# Patient Record
Sex: FEMALE | Race: BLACK OR AFRICAN AMERICAN | Employment: STUDENT | URBAN - METROPOLITAN AREA
[De-identification: names, ages, dates, MRNs, and addresses within clinical notes are randomized per-mention and may not be internally consistent; named-entity substitution may affect disease eponyms.]

---

## 2023-03-07 ENCOUNTER — OFFICE VISIT (OUTPATIENT)
Dept: ORTHOPEDIC SURGERY | Age: 23
End: 2023-03-07

## 2023-03-07 VITALS — WEIGHT: 158 LBS | BODY MASS INDEX: 29.83 KG/M2 | HEIGHT: 61 IN

## 2023-03-07 DIAGNOSIS — R52 PAIN: Primary | ICD-10-CM

## 2023-03-07 DIAGNOSIS — Z87.39 HISTORY OF SLIPPED CAPITAL FEMORAL EPIPHYSIS: ICD-10-CM

## 2023-03-07 RX ORDER — IBUPROFEN 800 MG/1
TABLET ORAL
COMMUNITY
Start: 2023-02-09

## 2023-03-07 NOTE — PROGRESS NOTES
Chief Complaint   Patient presents with    New Patient     BILATERAL HIP PAIN : H/O B/L HIP SURGERY 2010

## 2023-03-07 NOTE — PROGRESS NOTES
This 79-year-old patient is seen here complaining of pain mainly in the right groin. This is him going on for now few months. The patient says that at about age 7 she had surgery on both the hips. 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 There is no radiation of pain no pain associated with paresthesia. No history of back issues. Examination: Her gait and stance were normal.  Hip examination showed excellent motion but definite pain on internal/external rotation of the right hip in the groin. X-rays: Patient has had bilateral pinning of SCFE. On the initial x-ray there did appear to be leg length discrepancy but this was taken in supine position and therefore other AP pelvis was taken in standing position with knees straight and it did show about 0.9 cm shortening of the right side. Additionally I feel there is some narrowing of the superior and medial joint space on the right side compared to the left side. Diagnosis: Early osteoarthritis right hip. #2 healed lateral SCFE. #3 right leg shortening. 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 We will see her as needed.

## 2023-06-06 ENCOUNTER — HOSPITAL ENCOUNTER (OUTPATIENT)
Age: 23
Discharge: HOME | End: 2023-06-06

## 2023-06-06 LAB — RUBV IGG SERPL QL IA: 236.5 IU/ML

## 2023-06-06 PROCEDURE — 86481 TB AG RESPONSE T-CELL SUSP: CPT

## 2023-06-06 PROCEDURE — 86765 RUBEOLA ANTIBODY: CPT

## 2023-06-06 PROCEDURE — 86735 MUMPS ANTIBODY: CPT

## 2023-06-06 PROCEDURE — 86762 RUBELLA ANTIBODY: CPT

## 2023-06-06 PROCEDURE — 36415 COLL VENOUS BLD VENIPUNCTURE: CPT

## 2023-06-06 PROCEDURE — 86787 VARICELLA-ZOSTER ANTIBODY: CPT

## 2023-06-07 LAB
MEASLES ANTIBODY IGG: 1.58
MUV IGG SER QL: 0.29
VZV IGG SER QL IA: 0.16

## 2023-06-09 LAB — T-SPOT TB TEST: NORMAL

## 2023-11-02 ENCOUNTER — HOSPITAL ENCOUNTER (EMERGENCY)
Age: 23
Discharge: HOME | End: 2023-11-02
Attending: EMERGENCY MEDICINE
Payer: MEDICAID

## 2023-11-02 VITALS
SYSTOLIC BLOOD PRESSURE: 134 MMHG | TEMPERATURE: 98.1 F | HEART RATE: 66 BPM | BODY MASS INDEX: 29.33 KG/M2 | WEIGHT: 155.2 LBS | RESPIRATION RATE: 17 BRPM | OXYGEN SATURATION: 98 % | DIASTOLIC BLOOD PRESSURE: 79 MMHG

## 2023-11-02 DIAGNOSIS — K64.4 EXTERNAL HEMORRHOID: Primary | ICD-10-CM

## 2023-11-02 PROCEDURE — 99283 EMERGENCY DEPT VISIT LOW MDM: CPT | Performed by: EMERGENCY MEDICINE

## 2023-11-02 RX ORDER — POLYETHYLENE GLYCOL 3350 17 G/17G
17 POWDER, FOR SOLUTION ORAL DAILY PRN
Qty: 30 PACKET | Refills: 0 | Status: SHIPPED | OUTPATIENT
Start: 2023-11-02 | End: 2023-12-02

## 2023-11-02 RX ORDER — HYDROCORTISONE 25 MG/G
CREAM TOPICAL
Qty: 28 G | Refills: 3 | Status: SHIPPED | OUTPATIENT
Start: 2023-11-02

## 2023-11-02 ASSESSMENT — PAIN SCALES - GENERAL: PAINLEVEL_OUTOF10: 8

## 2023-11-02 ASSESSMENT — ENCOUNTER SYMPTOMS
COUGH: 0
SORE THROAT: 0
VOMITING: 0
NAUSEA: 0
BACK PAIN: 0
EYE REDNESS: 0
SHORTNESS OF BREATH: 0
ABDOMINAL PAIN: 0

## 2023-11-02 ASSESSMENT — PAIN DESCRIPTION - LOCATION: LOCATION: RECTUM

## 2023-11-02 ASSESSMENT — PAIN - FUNCTIONAL ASSESSMENT: PAIN_FUNCTIONAL_ASSESSMENT: 0-10

## 2023-11-03 NOTE — DISCHARGE INSTRUCTIONS
You were seen  in the ED today for hemorrhoids. You were prescribed a rectal cram and a rectal foam, please be advised to use either one as they are both made of similar components; use which ever is covered by your insurance plan/fits your budget/works for you. You were also prescribed a stool softener to help you avoid any straining whilst passing a bowel movement. Call today or tomorrow to follow up with EMILE Chacon NP  in 3 days. After each bowel movement you can do a sitz bath, dry surrounding area and then apply the medication to the rectal region. Avoid bulk laxatives. Use the stool softner or add bran and roughage to your diet. Return to the emergency department for worsening of pain, fever > 101.5, excessive nausea or vomiting, diarrhea, blood in stool, any other care or concern.

## 2023-11-03 NOTE — ED PROVIDER NOTES
Pt moved to rm 4 post witnessed seizure like activity. Agonal breathing with sonorous resp noted. md [facility] aware.     vo ativan 2 mg iv ordered and pulled     Dali Stone RN  04/24/22 2084 Rectum: Tenderness and external hemorrhoid present. No anal fissure. Comments: Chaperone: Dr. Benito Betancourt     External hemorrhoid at [facility] position, non-thrombosed  Tender on palpation   Skin:     General: Skin is warm. Capillary Refill: Capillary refill takes less than 2 seconds. Neurological:      General: No focal deficit present. Mental Status: She is alert and oriented to person, place, and time. DDX/DIAGNOSTIC RESULTS / EMERGENCY DEPARTMENT COURSE / MDM     Medical Decision Making  23-year-old female, previously history of hemorrhoids, presents to the ED with complaints of a cyst in the anal cleft. She first noticed it a few days ago, however it was getting more painful today. Denies straining, constipation, diarrhea, abdominal pain. States that her last bowel movement was this morning, nonbloody     On exam, external hemorrhoids at 6'oclock, tender, nonthrombosed. Discussed pain management with patient, sitz bath's, Anusol versus Proctofoam.  Discussed that we will be prescribing both and she could decide on which she would like to use based on insurance coverage, financial burden, or affectivity. Risk  Prescription drug management. EKG      All EKG's are interpreted by the Emergency Department Physician who either signs or Co-signs this chart in the absence of a cardiologist.    EMERGENCY DEPARTMENT COURSE:           PROCEDURES:      CONSULTS:  None    CRITICAL CARE:  There was significant risk of life threatening deterioration of patient's condition requiring my direct management. Critical care time 0 minutes, excluding any documented procedures. FINAL IMPRESSION      1.  External hemorrhoid          DISPOSITION / PLAN     DISPOSITION Decision To Discharge 11/02/2023 10:36:31 PM      PATIENT REFERRED TO:  EMILE Alfaro NP    In 3 days  As needed, If symptoms worsen      DISCHARGE MEDICATIONS:  New Prescriptions    HYDROCORT-PRAMOXINE, PERIANAL, (PROCTOFOAM-HC) 1-1 % RECTAL FOAM    Hemorrhoids    HYDROCORTISONE (ANUSOL-HC) 2.5 % CREA RECTAL CREAM    Hemorrhoids    POLYETHYLENE GLYCOL (MIRALAX) 17 G PACKET    Take 1 packet by mouth daily as needed for Constipation       Peg Marie MD  Emergency Medicine Resident    (Please note that portions of thisnote were completed with a voice recognition program.  Efforts were made to edit the dictations but occasionally words are mis-transcribed.)        Peg Marie MD  Resident  11/02/23 1302       Peg Marie MD  Resident  11/02/23 3969

## 2023-11-12 ENCOUNTER — HOSPITAL ENCOUNTER (EMERGENCY)
Age: 23
Discharge: HOME | End: 2023-11-12
Attending: EMERGENCY MEDICINE
Payer: MEDICAID

## 2023-11-12 ENCOUNTER — APPOINTMENT (OUTPATIENT)
Dept: GENERAL RADIOLOGY | Age: 23
End: 2023-11-12
Payer: MEDICAID

## 2023-11-12 VITALS
WEIGHT: 153.22 LBS | OXYGEN SATURATION: 98 % | TEMPERATURE: 98.2 F | DIASTOLIC BLOOD PRESSURE: 80 MMHG | SYSTOLIC BLOOD PRESSURE: 126 MMHG | HEART RATE: 100 BPM | RESPIRATION RATE: 15 BRPM | BODY MASS INDEX: 28.95 KG/M2

## 2023-11-12 DIAGNOSIS — S43.005A DISLOCATION OF LEFT SHOULDER JOINT, INITIAL ENCOUNTER: Primary | ICD-10-CM

## 2023-11-12 PROCEDURE — 6360000002 HC RX W HCPCS

## 2023-11-12 PROCEDURE — 73030 X-RAY EXAM OF SHOULDER: CPT

## 2023-11-12 PROCEDURE — 96372 THER/PROPH/DIAG INJ SC/IM: CPT

## 2023-11-12 PROCEDURE — 99284 EMERGENCY DEPT VISIT MOD MDM: CPT

## 2023-11-12 RX ORDER — KETOROLAC TROMETHAMINE 15 MG/ML
15 INJECTION, SOLUTION INTRAMUSCULAR; INTRAVENOUS ONCE
Status: COMPLETED | OUTPATIENT
Start: 2023-11-12 | End: 2023-11-12

## 2023-11-12 RX ORDER — IBUPROFEN 200 MG
600 TABLET ORAL EVERY 8 HOURS PRN
Qty: 30 TABLET | Refills: 0 | Status: SHIPPED | OUTPATIENT
Start: 2023-11-12 | End: 2023-11-19

## 2023-11-12 RX ORDER — ACETAMINOPHEN 500 MG
1000 TABLET ORAL 3 TIMES DAILY
Qty: 42 TABLET | Refills: 0 | Status: SHIPPED | OUTPATIENT
Start: 2023-11-12 | End: 2023-11-12 | Stop reason: SDUPTHER

## 2023-11-12 RX ORDER — ACETAMINOPHEN 500 MG
1000 TABLET ORAL 3 TIMES DAILY
Qty: 42 TABLET | Refills: 0 | Status: SHIPPED | OUTPATIENT
Start: 2023-11-12 | End: 2023-11-19

## 2023-11-12 RX ADMIN — KETOROLAC TROMETHAMINE 15 MG: 15 INJECTION, SOLUTION INTRAMUSCULAR; INTRAVENOUS at 18:22

## 2023-11-12 ASSESSMENT — PAIN SCALES - GENERAL
PAINLEVEL_OUTOF10: 9
PAINLEVEL_OUTOF10: 9

## 2023-11-12 ASSESSMENT — PAIN - FUNCTIONAL ASSESSMENT: PAIN_FUNCTIONAL_ASSESSMENT: 0-10

## 2023-11-12 ASSESSMENT — PAIN DESCRIPTION - ORIENTATION: ORIENTATION: LEFT

## 2023-11-12 ASSESSMENT — ENCOUNTER SYMPTOMS
BACK PAIN: 0
SHORTNESS OF BREATH: 0

## 2023-11-12 ASSESSMENT — PAIN DESCRIPTION - LOCATION: LOCATION: SHOULDER

## 2023-11-12 NOTE — ED TRIAGE NOTES
Pt arrived to ED via triage C/O left shoulder pain. Started 30 minutes ago. Per pt, she was trying to place a wreath when she felt a pop. No previous dislocation. Denies other symptoms. RR even and unlabored, A+O x 4, ambulatory, call light in reach.

## 2023-11-12 NOTE — ED PROVIDER NOTES
[facility] ED  Emergency Department Encounter  Emergency Medicine Resident     Pt Name:Anselmo Leonard  MRN: [id number]  [address] [facility] 2000  Date of evaluation: 11/12/23  PCP:  EMILE Dior NP  Note Started: 5:51 PM EST      CHIEF COMPLAINT       Chief Complaint   Patient presents with    Shoulder Pain     Left       HISTORY OF PRESENT ILLNESS  (Location/Symptom, Timing/Onset, Context/Setting, Quality, Duration, Modifying Factors, Severity.)      Linton Kussmaul is a 20 y.o. female with a history of SCFE who presents with left shoulder pain that began about 30 minutes ago. Patient states she was putting up a wreath in her house when she felt her shoulder pop out of place. Patient had severe pain, numbness, tingling going down her left arm. She then felt a clunking sensation and felt it pop back into place. Patient states her pain is decreased but she continues to have soreness over this area. Did not take any medications at home. No history of shoulder dislocation. No other injury including fall or hitting her head. Denies numbness, weakness in her upper extremity currently      PAST MEDICAL / SURGICAL / SOCIAL / FAMILY HISTORY      has no past medical history on file. SCFE     has a past surgical history that includes hip surgery. Social History     Socioeconomic History    Marital status: Single     Spouse name: Not on file    Number of children: Not on file    Years of education: Not on file    Highest education level: Not on file   Occupational History    Not on file   Tobacco Use    Smoking status: Never    Smokeless tobacco: Never   Substance and Sexual Activity    Alcohol use: Yes     Comment: occ.     Drug use: Never    Sexual activity: Not on file   Other Topics Concern    Not on file   Social History Narrative    Not on file     Social Determinants of Health     Financial Resource Strain: Not on file   Food Insecurity: Not on file   Transportation Needs: Not on

## 2023-11-13 NOTE — ED NOTES
Report received from Marcello Gambino [place]  All questions answered     Thalia Escalante RN  11/12/23 1923

## 2023-11-13 NOTE — DISCHARGE INSTRUCTIONS
You were seen for left shoulder pain. It is likely that you dislocated and relocated your shoulder at home. Your x-ray in the emergency department did not show any dislocation or fracture. You will be discharged home with a sling that you need to wear at all times until you are able to follow-up with orthopedics. The information for an orthopedist is attached above. You need to call them to make an appointment as soon as possible. You will be given ibuprofen and Tylenol that you can take at home for pain. You can alternate taking these every 4 hours. You can ice and apply heat to the area as tolerated to help with pain. Return the emergency department for any worsening pain, swelling, numbness, weakness, chest pain, shortness of breath, other new or concerning symptoms.

## 2023-11-15 ENCOUNTER — OFFICE VISIT (OUTPATIENT)
Dept: ORTHOPEDIC SURGERY | Age: 23
End: 2023-11-15

## 2023-11-15 VITALS — WEIGHT: 153 LBS | HEIGHT: 61 IN | BODY MASS INDEX: 28.89 KG/M2

## 2023-11-15 DIAGNOSIS — S43.432A LABRAL TEAR OF SHOULDER, LEFT, INITIAL ENCOUNTER: Primary | ICD-10-CM

## 2023-11-15 NOTE — PROGRESS NOTES
shoulder    Mukesh Fleming MD, Orthopaedic Surgery, [facility]     Referral Priority:   Routine     Referral Type:   Eval and Treat     Referral Reason:   Specialty Services Required     Referred to Provider:   Renee Urbano MD     Requested Specialty:   Orthopedic Surgery     Number of Visits Requested:   1        Electronically signed by Jose Ramon Evans DO on 11/15/2023 at 4:50 PM    This note is created with the assistance of a speech recognition program.  While intending to generate a document that actually reflects the content of the visit, the document can still have some errors including those of syntax and sound a like substitutions which may escape proof reading.   In such instances, actual meaning can be extrapolated by contextual diversion

## 2023-11-28 SDOH — HEALTH STABILITY: PHYSICAL HEALTH: ON AVERAGE, HOW MANY DAYS PER WEEK DO YOU ENGAGE IN MODERATE TO STRENUOUS EXERCISE (LIKE A BRISK WALK)?: 5 DAYS

## 2023-11-28 SDOH — HEALTH STABILITY: PHYSICAL HEALTH: ON AVERAGE, HOW MANY MINUTES DO YOU ENGAGE IN EXERCISE AT THIS LEVEL?: 150+ MIN

## 2023-11-28 ASSESSMENT — SOCIAL DETERMINANTS OF HEALTH (SDOH)
WITHIN THE LAST YEAR, HAVE YOU BEEN KICKED, HIT, SLAPPED, OR OTHERWISE PHYSICALLY HURT BY YOUR PARTNER OR EX-PARTNER?: NO
WITHIN THE LAST YEAR, HAVE YOU BEEN AFRAID OF YOUR PARTNER OR EX-PARTNER?: NO
WITHIN THE LAST YEAR, HAVE YOU BEEN HUMILIATED OR EMOTIONALLY ABUSED IN OTHER WAYS BY YOUR PARTNER OR EX-PARTNER?: NO
WITHIN THE LAST YEAR, HAVE TO BEEN RAPED OR FORCED TO HAVE ANY KIND OF SEXUAL ACTIVITY BY YOUR PARTNER OR EX-PARTNER?: NO

## 2023-11-29 ENCOUNTER — OFFICE VISIT (OUTPATIENT)
Age: 23
End: 2023-11-29
Payer: MEDICAID

## 2023-11-29 VITALS — WEIGHT: 153 LBS | HEIGHT: 60 IN | BODY MASS INDEX: 30.04 KG/M2

## 2023-11-29 DIAGNOSIS — S43.432A LABRAL TEAR OF SHOULDER, LEFT, INITIAL ENCOUNTER: Primary | ICD-10-CM

## 2023-11-29 DIAGNOSIS — S43.005A DISLOCATION OF LEFT SHOULDER JOINT, INITIAL ENCOUNTER: Primary | ICD-10-CM

## 2023-11-29 PROCEDURE — 1036F TOBACCO NON-USER: CPT | Performed by: ORTHOPAEDIC SURGERY

## 2023-11-29 PROCEDURE — 99204 OFFICE O/P NEW MOD 45 MIN: CPT | Performed by: ORTHOPAEDIC SURGERY

## 2023-11-29 PROCEDURE — G8484 FLU IMMUNIZE NO ADMIN: HCPCS | Performed by: ORTHOPAEDIC SURGERY

## 2023-11-29 PROCEDURE — G8419 CALC BMI OUT NRM PARAM NOF/U: HCPCS | Performed by: ORTHOPAEDIC SURGERY

## 2023-11-29 PROCEDURE — G8427 DOCREV CUR MEDS BY ELIG CLIN: HCPCS | Performed by: ORTHOPAEDIC SURGERY

## 2023-11-29 NOTE — PROGRESS NOTES
Visits Requested:   1       Assessment and Plan:  1. Dislocation of left shoulder joint, initial encounter          This is a 24 y.o. female with left shoulder possible dislocation versus subluxation. At this point she does have an MRI arthrogram scheduled for this week. I will see her back in 2 weeks to go over the MRI arthrogram.  In that meantime we will get her into some therapy. I am okay with her returning to work with light duty. I will see her back in a couple weeks for further evaluation. Review of Systems   Constitutional: Negative for fever, chills, sweats. Neurological: Negative numbness, or weakness. Integumentary: Negative for rash, itching, laceration, or abrasion.    Musculoskeletal: Positive for Shoulder Injury           Past History:    Current Outpatient Medications:     ibuprofen (ADVIL;MOTRIN) 200 MG tablet, Take 3 tablets by mouth every 8 hours as needed for Pain or Fever, Disp: 30 tablet, Rfl: 0    acetaminophen (TYLENOL) 500 MG tablet, Take 2 tablets by mouth 3 times daily for 7 days, Disp: 42 tablet, Rfl: 0    Hydrocort-Pramoxine, Perianal, (PROCTOFOAM-HC) 1-1 % rectal foam, Hemorrhoids (Patient not taking: Reported on 11/12/2023), Disp: 10 g, Rfl: 3    hydrocortisone (ANUSOL-HC) 2.5 % CREA rectal cream, Hemorrhoids (Patient not taking: Reported on 11/12/2023), Disp: 28 g, Rfl: 3    polyethylene glycol (MIRALAX) 17 g packet, Take 1 packet by mouth daily as needed for Constipation (Patient not taking: Reported on 11/12/2023), Disp: 30 packet, Rfl: 0    ibuprofen (ADVIL;MOTRIN) 800 MG tablet, TAKE 1 TABLET BY MOUTH THREE TIMES DAILY AS NEEDED, Disp: , Rfl:   No Known Allergies  Social History     Socioeconomic History    Marital status: Single     Spouse name: Not on file    Number of children: Not on file    Years of education: Not on file    Highest education level: Not on file   Occupational History    Not on file   Tobacco Use    Smoking status: Never    Smokeless tobacco:

## 2023-11-29 NOTE — PROGRESS NOTES
Radiology requested the following:   Order for left shoulder CT with intra-articular contrast today. Also ordered IR injection arthrogram shoulder.   Provided today 11/29/23 at 737am    _________________________________  Nalini Arita DO  Orthopedic Surgery Resident, PGY-2  [address], [place]

## 2023-11-29 NOTE — PROGRESS NOTES
Radiology now requesting an order for strictly left shoulder MRI with contrast; not the current w and wo. MRI left shoulder w contrast ordered. Could not cancel MRI left shoulder w and wo contrast order since it was already scheduled.      _________________________________  David Michaels DO  Orthopedic Surgery Resident, PGY-2  [address] [place] [address]

## 2023-12-01 ENCOUNTER — HOSPITAL ENCOUNTER (OUTPATIENT)
Dept: MRI IMAGING | Age: 23
End: 2023-12-01
Payer: MEDICAID

## 2023-12-01 ENCOUNTER — HOSPITAL ENCOUNTER (OUTPATIENT)
Dept: INTERVENTIONAL RADIOLOGY/VASCULAR | Age: 23
End: 2023-12-01
Payer: MEDICAID

## 2023-12-01 DIAGNOSIS — S43.432A LABRAL TEAR OF SHOULDER, LEFT, INITIAL ENCOUNTER: ICD-10-CM

## 2023-12-01 PROCEDURE — 23350 INJECTION FOR SHOULDER X-RAY: CPT

## 2023-12-01 PROCEDURE — 77002 NEEDLE LOCALIZATION BY XRAY: CPT

## 2023-12-01 PROCEDURE — 73222 MRI JOINT UPR EXTREM W/DYE: CPT

## 2023-12-01 PROCEDURE — 6360000004 HC RX CONTRAST MEDICATION: Performed by: STUDENT IN AN ORGANIZED HEALTH CARE EDUCATION/TRAINING PROGRAM

## 2023-12-01 RX ADMIN — IOHEXOL 10 ML: 240 INJECTION, SOLUTION INTRATHECAL; INTRAVASCULAR; INTRAVENOUS; ORAL at 15:12

## 2023-12-01 NOTE — PROGRESS NOTES
Patient to IR for left should arthrogram.  Dr. Ya Solis and [address] RTs at bedside. Site prepped and draped, area numbed with lidocaine. 10ml of omnipaque 240 with 0.2ml of prohance injected. Band aid placed to site. Patient tolerated well and is taken to MRI for further imaging.

## 2023-12-01 NOTE — BRIEF OP NOTE
Brief Postoperative Note    Anselmo Marshall  YOB: 2000  [id number]    Pre-operative Diagnosis: Left shoulder pain    Post-operative Diagnosis: Same    Procedure: Fluoro guided needle placement into the joint for MRI arthrogram    Anesthesia: Local    Surgeons/Assistants: Scout    Estimated Blood Loss: less than 50     Complications: None    Specimens: Was Not Obtained     Electronically signed by Jeramy Torres MD on 12/1/2023 at 3:34 PM

## 2023-12-05 NOTE — CONSULTS
[x] [address] [facility]  Outpatient Rehabilitation &  Therapy  [address] [place] [address] Rd [address]  P: [phone]  F: [phone]     Physical Therapy Upper Extremity Evaluation    Date:  2023  Patient: Paulette Raman    : 2000  MRN: [id number]  Physician: Nato Birch MD   Insurance: [facility] ([facility])  Medical Diagnosis: S43.005A (ICD-10-CM) - Dislocation of left shoulder joint, initial encounter     Rehab Codes: M25.512, M25.612, M79.622  Onset Date: 2023     Next 's appt: 2023    Subjective:   CC:Patient presents with left shoulder pain and decreased strength s/p dislocation. Recently returned to work. Has had some right shoulder pain due overusing it and sleep positions      HPI:   Was putting up wreath, it fell, she reached back with left arm \"popped out\"  Saw physician, meds, had MRI's, X rays, arthrograms. Was in a sling for 3 weeks.      1 year ago slipped on a slope at [facility] and had \"dull aches\" over the past year, didn't feel need to seek medical care    PMHx: [] Unremarkable [] Diabetes [] HTN  [] Pacemaker   [] MI/Heart Problems [] Cancer [x] Arthritis- early OA in hips and knees [] Other:              [x] Refer to full medical chart  In EPIC  (no hx in Epic)      Comorbidities:   [] Obesity [] Dialysis  [] N/A   [] Asthma/COPD [] Dementia [] Other:   [] Stroke [] Sleep apnea [] Other:   [] Vascular disease [] Rheumatic disease [] Other:     Tests: [x] X-Ray:   [x] MRI: [id number]:  No visualized labral tear , no rot cuff tear     [] Other:    Medications: [x] Refer to full medical record [] None [] Other:  Allergies:      [x] Refer to full medical record [] None [] Other:    Function:  Hand Dominance  [] Right  [x] Left  Patient lives with: Girl friend   Employer [employer]   Job Status []  Normal duty   [x] Light duty   [] Off due to condition    []  Retired   [] Not employed   [] Disability  [] Other:  [x]  Return to

## 2023-12-06 ENCOUNTER — HOSPITAL ENCOUNTER (OUTPATIENT)
Dept: PHYSICAL THERAPY | Facility: CLINIC | Age: 23
Setting detail: THERAPIES SERIES
Discharge: HOME | End: 2023-12-06
Payer: MEDICAID

## 2023-12-06 PROCEDURE — 97162 PT EVAL MOD COMPLEX 30 MIN: CPT

## 2023-12-06 PROCEDURE — 97110 THERAPEUTIC EXERCISES: CPT

## 2023-12-12 ENCOUNTER — HOSPITAL ENCOUNTER (OUTPATIENT)
Dept: PHYSICAL THERAPY | Facility: CLINIC | Age: 23
Setting detail: THERAPIES SERIES
Discharge: HOME | End: 2023-12-12
Payer: MEDICAID

## 2023-12-12 PROCEDURE — 97110 THERAPEUTIC EXERCISES: CPT

## 2023-12-12 NOTE — FLOWSHEET NOTE
[] [address]  [address] [facility].  [phone]  F: [phone] [x] [address] [facility]  [address]   [address]  P: [phone]  F: [phone] [] [address]  [place]: [phone]  F: [phone]      Physical Therapy Daily Treatment Note    Date:  2023  Patient Name:  Radha Gordon    :  2000  MRN: [id number]  Physician: Luzma Rao MD                                 Insurance: [facility] 12 visits, 48 units thru 2024)  Medical Diagnosis: S43.005A (ICD-10-CM) - Dislocation of left shoulder joint, initial encounter                     Rehab Codes: M25.512, M25.612, M79.622  Onset Date: 2023                      Next 's appt: 2023  Visit# / total visits: ; Cancels/No Shows: 0 /0    Subjective:    Pain:  [x] Yes  [] No Location: left shoulder  Pain Rating: (0-10 scale) 1/10  Pain altered Tx:  [x] No  [] Yes  Action:  Comments:Reports she is overall feeling a little better, difficulty sleeping , left shoulder \"cracking\" a lot.      Objective:  Modalities: ice/heat prn  Precautions: gentle strengthening x 6 weeks starting PT 2023  Essential tremor     Exercises:  Exercise Reps/ Time Weight/ Level Comments   PROM to left shoulder X    (do not force end range flexion, ER)             UBE  L1  2 min forward, 2 min backward  added, ROM, warm up , increased peripheral blood flow   mat         Cane press ups 20x 2#     triceps 20x 2#     Horizonal abd to left 20x 2#     Bicep curls 20x 2#     Scapular retraction 20x       Shoulder rolls 20x       Supine, flex at 90  10x all directions    external perturbations for joint stability              T band      added     rows  15  lime     ext 15 lime    ER 15 lime          Other:     Specific Instructions for next treatment:

## 2023-12-12 NOTE — FLOWSHEET NOTE
[] [address]  [address] [facility].  [phone]  F: [phone] [x] [address] [facility]  [address] [facility] [address]   [address]  P: [phone]  F: [phone] [] [address]  [place]: [phone]  F: [phone]      Physical Therapy Daily Treatment Note    Date:  2023  Patient Name:  Obdulio Ohara    :  2000  MRN: [id number]  Physician: Lavern Galeas MD                                 Insurance: [facility] 12 visits, 48 units thru 2024)  Medical Diagnosis: S43.005A (ICD-10-CM) - Dislocation of left shoulder joint, initial encounter                     Rehab Codes: M25.512, M25.612, M79.622  Onset Date: 2023                      Next 's appt: 2023  Visit# / total visits: ;     Cancels/No Shows: 0 / 0    Subjective:    Pain:  [x] Yes  [] No Location: left shoulder Pain Rating: (0-10 scale) not asked/10  Pain altered Tx:  [] No  [] Yes  Action:  Comments:left shoulder with motions reaching behind, sore with increased work tasks    Objective:  Modalities: ice/heat prn  Precautions: gentle strengthening x 6 weeks starting PT 2023  Essential tremor     Exercises:  Exercise Reps/ Time Weight/ Level Comments   PROM to left shoulder X                 UBE  2 min forwa  2 back    mat         Cane press ups 20x 2#     triceps 20x 2#     Horizonal abd to left 20x 2#     Bicep curls 20x 2#     Scapular retraction 20x       Shoulder rolls 20x       Supine, flex at 90  10x all directions   Next, add in external perturbations for joint stability   added              band retraction  20x  orange   started    rows - scaption  20x  orange     Ext - smaller range 20x orange          Add ball on wall   next               Other:     Specific Instructions for next treatment:review HEP  May start light

## 2023-12-13 ENCOUNTER — OFFICE VISIT (OUTPATIENT)
Age: 23
End: 2023-12-13
Payer: MEDICAID

## 2023-12-13 DIAGNOSIS — M25.512 ACUTE PAIN OF LEFT SHOULDER: Primary | ICD-10-CM

## 2023-12-13 PROCEDURE — 99214 OFFICE O/P EST MOD 30 MIN: CPT | Performed by: ORTHOPAEDIC SURGERY

## 2023-12-13 PROCEDURE — 1036F TOBACCO NON-USER: CPT | Performed by: ORTHOPAEDIC SURGERY

## 2023-12-13 PROCEDURE — G8427 DOCREV CUR MEDS BY ELIG CLIN: HCPCS | Performed by: ORTHOPAEDIC SURGERY

## 2023-12-13 PROCEDURE — G8484 FLU IMMUNIZE NO ADMIN: HCPCS | Performed by: ORTHOPAEDIC SURGERY

## 2023-12-13 PROCEDURE — G8419 CALC BMI OUT NRM PARAM NOF/U: HCPCS | Performed by: ORTHOPAEDIC SURGERY

## 2023-12-13 NOTE — PROGRESS NOTES
[place] [address] AND SPORTS MEDICINE  [address] [place] [address] [facility] [address]  [place] [address]  Dept: [phone]  Dept Fax: [phone]    Chief Compliant:  No chief complaint on file. History of Present Illness: This is a 24 y.o. female who presents to the clinic today for evaluation / follow up of MRI follow-up of the left shoulder. She states that she still has some soreness in the shoulder but she has started therapy. She has not had any further subluxation or dislocation episodes. Physical Exam:    On exam today she has 5-5 rotator cuff strength but there is some discomfort against resistance she has no apprehension with abduction external rotation of the shoulder she has full range of motion actively and passively. No effusion of the shoulder skin is intact. Nursing note and vitals reviewed. Labs and Imaging: MRI of the left shoulder does not show any labral tear. Rotator cuff is intact no biceps pathology. XR taken today:  No results found. No orders of the defined types were placed in this encounter. Assessment and Plan:  No diagnosis found. This is a 24 y.o. female with left shoulder subluxation possible dislocation. At this point the MRI does not show any evidence of labral tear. For this reason I am very optimistic that she can be rehabbed without any need for surgery. I explained this to her. She is in a finish out therapy and if she has any continued problems after that then we can see her back as needed. .         Review of Systems   Constitutional: Negative for fever, chills, sweats. Neurological: Negative numbness, or weakness. Integumentary: Negative for rash, itching, laceration, or abrasion. Musculoskeletal: Positive for No chief complaint on file.            Past History:    Current Outpatient Medications:     ibuprofen (ADVIL;MOTRIN) 200 MG

## 2024-01-02 ENCOUNTER — HOSPITAL ENCOUNTER (OUTPATIENT)
Dept: PHYSICAL THERAPY | Facility: CLINIC | Age: 24
Setting detail: THERAPIES SERIES
Discharge: HOME | End: 2024-01-02
Payer: MEDICAID

## 2024-01-02 PROCEDURE — 97110 THERAPEUTIC EXERCISES: CPT

## 2024-01-02 NOTE — FLOWSHEET NOTE
[] [facility]  Outpatient Rehabilitation &  Therapy  [address]  [phone]  F: [phone] [x] [facility]  Outpatient Rehabilitation &  Therapy  [address] [facility]   [address]  P: [phone]  F: [phone] [] [facility]  Outpatient Rehabilitation &  Therapy  [address] [facility] [address]  P: [phone]  F: [phone]      Physical Therapy Daily Treatment Note    Date:  2024  Patient Name:  Anselmo Vu    :  2000  MRN: [id number]  Physician: Ashutosh Valencia MD                                 Insurance: [facility] ([facility] 12 visits, 48 units thru 2024)  Medical Diagnosis: S43.005A (ICD-10-CM) - Dislocation of left shoulder joint, initial encounter                     Rehab Codes: M25.512, M25.612, M79.622  Onset Date: 2023                      Next 's appt: as needed  Visit# / total visits: ;    Cancels/No Shows: 0 /0    Subjective:    Pain:  [x] Yes  [] No Location: left shoulder  Pain Rating: (0-10 scale) 3-4/10 upon arrival   Pain altered Tx:  [x] No  [] Yes  Action:  Comments: Pt arrives with mild pain symptoms in L shoulder and reports good compliance with HEP.       Objective:  Modalities: ice/heat prn  Precautions: gentle strengthening x 6 weeks starting PT 2023  Essential tremor     Exercises:  Exercise Reps/ Time Weight/ Level Comments   PROM to left shoulder X    (do not force end range flexion, ER)             UBE  L1  2 min forward, 2 min backward  added, ROM, warm up , increased peripheral blood flow   mat         Supine      Cane press ups supine 20x 2#  with weighted cane   Triceps- B supine 20x 1#     Horizonal abd to left 20x 2#  with weighted cane   Protracation  20 1#  B, added 23   Scapular retraction 20x    at home   Shoulder rolls 20x    at home   Supine, flex at 90  10x all directions    external perturbations for joint stability         Sidelying      ER 10x2 1# added 23,

## 2024-01-04 ENCOUNTER — HOSPITAL ENCOUNTER (OUTPATIENT)
Dept: PHYSICAL THERAPY | Facility: CLINIC | Age: 24
Setting detail: THERAPIES SERIES
Discharge: HOME | End: 2024-01-04
Payer: MEDICAID

## 2024-01-04 PROCEDURE — 97110 THERAPEUTIC EXERCISES: CPT

## 2024-01-04 NOTE — FLOWSHEET NOTE
Sidelying      ER 10x2 1# added 12/20/23, inc reps 1/2    Abd to 90 10x2  1# added 12/20/23, progressed 1/2          Seated      Bicep curls- standing  20  3#  added 12/20/23         Standing      Shld flex 2x15 1# added 12/20/23, progressed 1/2, 10 ~ 120   scaption 2x15 1#  added 12/20/23, progressed 1/2    Abduction to 90  10x  1# 1/4 wt added         T band      added 12/12   rows 20 lime     ext 20 lime    ER 20 lime    depression 20 lime added 12/20/23   Triceps press 20 lime added 12/20/23         Ball on wall  20xea  1.1# ball  Up/down, side to side, CW/CCW, new 1/2          Other:  Strength:  L  shld improving - 3/5 for flex/ abd and ER 12/20/23      Specific Instructions for next treatment:  Continue light strengthening in available range    Treatment Charges: Mins Units   []  Modalities     [x]  Ther Exercise 40 3   []  Manual Therapy     []  Ther Activities     []  Neuro Re-ed     []  Vasocompression     [] Gait     []  Other     Total Billable time 40 3       Assessment: [x] Progressing toward goals.Patient doing well with progressing flexion and abd range.  Continued soreness end range IR/ER and end of work day fatigue.      Discontinued PROM as flexion and abduction are functional and not stressing end range IR/ER.       [] No change.     [] Other:  [x] Patient would continue to benefit from skilled physical therapy services in order to: Regain full functional range and strength in left shoulder to be able to complete home tasks and full RTW without restrictions    STG: (to be met in 6 treatments)  ? Pain: 3/10 in left shoulder to allow patient to participate in active light strengthening routine 1/4/2024 met  ? ROM: Full AROM of left shoulder = to right to improve range reaching up and ease donning and doffing shirts overhead   1-4-2024 - left shoulder AROM    Flexion  158    Abd  159    IR behind back sitting to mid glut    ER at 90,  80, \"sore\"  ? Strength: 3/5 in left shoulder to start restoring

## 2024-01-08 ENCOUNTER — HOSPITAL ENCOUNTER (OUTPATIENT)
Dept: PHYSICAL THERAPY | Facility: CLINIC | Age: 24
Setting detail: THERAPIES SERIES
Discharge: HOME | End: 2024-01-08
Payer: MEDICAID

## 2024-01-08 PROCEDURE — 97110 THERAPEUTIC EXERCISES: CPT

## 2024-01-08 NOTE — FLOWSHEET NOTE
[] [facility]  Outpatient Rehabilitation &  Therapy  [address]  [phone]  F: [phone] [x] [facility]  Outpatient Rehabilitation &  Therapy  [address]   [address]  P: [phone]  F: [phone] [] [facility]  Outpatient Rehabilitation &  Therapy  [address]  P: [phone]  F: [phone]      Physical Therapy Daily Treatment Note    Date:  2024  Patient Name:  Anselmo Vu    :  2000  MRN: [id number]  Physician: Ashutosh Valencia MD                                 Insurance: [facility] (AUTH 12 visits, 48 units thru 2024)  Medical Diagnosis: S43.005A (ICD-10-CM) - Dislocation of left shoulder joint, initial encounter                     Rehab Codes: M25.512, M25.612, M79.622  Onset Date: 2023                      Next 's appt: as needed  Visit# / total visits: ;    Cancels/No Shows: 0 /0    Subjective:    Pain:  [] Yes  [x] No Location: left shoulder  Pain Rating: (0-10 scale) 0/10 upon arrival   Pain altered Tx:  [x] No  [] Yes  Action:  Comments: Pt arrives without pain noting it is a good day. Was sore after last session. Over the weekend reports she reached quickly for her locker which caused a sharp pain. Has pain when sleeping on L side.     Objective:  Modalities: ice/heat prn  Precautions: gentle strengthening x 6 weeks starting PT 2023  Essential tremor     Exercises:  Exercise Reps/ Time Weight/ Level Comments             UBE  L2  2 min forward, 2 min backward  added, ROM, warm up , increased peripheral blood flow  1/4 inc resistance, 1/8   mat         Supine      Cane press ups + SA punch supine 20x 3#  with weighted cane  1/4 increased wt + SA punch 1/8   Triceps- B supine 20x 2#  ncr wt 1/8   Horizonal abd/add -pec flies  20x 2#  A 1/8   Protraction  20x 2#  B, added 23  1/4 inc wt.    Supine, flex at 90 10x all directions    external perturbations for joint

## 2024-01-11 ENCOUNTER — HOSPITAL ENCOUNTER (OUTPATIENT)
Dept: PHYSICAL THERAPY | Facility: CLINIC | Age: 24
Setting detail: THERAPIES SERIES
Discharge: HOME | End: 2024-01-11
Payer: MEDICAID

## 2024-01-11 PROCEDURE — 97110 THERAPEUTIC EXERCISES: CPT

## 2024-01-11 NOTE — FLOWSHEET NOTE
at 90 10x all directions    external perturbations for joint stability- not today 1/11   Supine flexion 15 1# New 1/11   Sidelying      ER 10x2 2# added 12/20/23, inc reps 1/2 ; incr wt 1/8   Abd to 90 15x  2# added 12/20/23, progressed 1/2; incr wt 1/8         Standing      Bicep curls- 3 way  20xea  3#  added 12/20/23- 3 way 1/8               Shld flex 20 1# added 12/20/23, progressed 1/2, 10 ~ 120   scaption 20 1#  added 12/20/23, progressed 1/2    Abduction to 90  20 1# 1/4 wt added- 2nd set 1/8   Don't shoots 10  L in avail ROM, new 1/11         Prone:       Shldr ext 15 1# New 1/11   retraction 15 1# New 1/11               T band      added 12/12   rows 15 blue  res added 1/11   ext 15 blue Res added 1/11   ER 15 blue    depression 15 blue added 12/20/23   Triceps press 15 blue added 12/20/23         Ball on wall  20xea  2.1# ball  Up/down, side to side, CW/CCW, ball wt increased 1/11   Wall push ups 20  New 1/8   Other:  Strength:  L  shld improving - 3/5 for flex/ abd and ER 12/20/23      Specific Instructions for next treatment:  Continue light strengthening in available range    Treatment Charges: Mins Units   []  Modalities     [x]  Ther Exercise 50 3   []  Manual Therapy     []  Ther Activities     []  Neuro Re-ed     []  Vasocompression     [] Gait     []  Other     Total Billable time 50 3       Assessment: [x] Progressing toward goals: Pt presents to therapy with increased pain anteriorly resulting from working different position last night which required her to do pushing and pulling of heavy carts. Iced at home after shift which helped slightly. Despite reports of increased pain, pt was still able to continue with typical ex flow. Did modify some reps and weight in order to avoid further flaring shoulder sx but pt denies increase in pain overall and was even able to tolerate mild strength progressions. Instructed in icing after work shift this date. Will continue as able.     [] No change.     []

## 2024-01-18 ENCOUNTER — HOSPITAL ENCOUNTER (OUTPATIENT)
Dept: PHYSICAL THERAPY | Facility: CLINIC | Age: 24
Setting detail: THERAPIES SERIES
Discharge: HOME | End: 2024-01-18
Payer: MEDICAID

## 2024-01-18 PROCEDURE — 97110 THERAPEUTIC EXERCISES: CPT

## 2024-01-18 NOTE — FLOWSHEET NOTE
[] [facility]  Outpatient Rehabilitation &  Therapy  [address]  [phone]  F: [phone] [x] [facility]  Outpatient Rehabilitation &  Therapy  [address]   [address]  P: [phone]  F: [phone] [] [facility]  Outpatient Rehabilitation &  Therapy  [address]  P: [phone]  F: [phone]      Physical Therapy Daily Treatment Note    Date:  2024  Patient Name:  Anselmo Vu    :  2000  MRN: [id number]  Physician: Ashutosh Valencia MD                                 Insurance: [facility] (AUTH 12 visits, 48 units thru 2024)  Medical Diagnosis: S43.005A (ICD-10-CM) - Dislocation of left shoulder joint, initial encounter                     Rehab Codes: M25.512, M25.612, M79.622  Onset Date: 2023                      Next 's appt: as needed  Visit# / total visits: ;    Cancels/No Shows: 0 /1    Subjective:    Pain:  [] Yes  [x] No Location: left shoulder  Pain Rating: (0-10 scale) 0/10   Pain altered Tx:  [x] No  [] Yes  Action:  Comments: Pt arrives reporting I \"feeling pretty good today\".  Today first day with no pain.  Seems overall to be worse on the weekends. (Works Monday to Friday)    Objective:  Modalities: ice/heat prn  Precautions: gentle strengthening x 6 weeks starting PT 2023  Essential tremor     Exercises:bolded completed  Exercise Reps/ Time Weight/ Level Comments             UBE  L2  2 min forward, 2 min backward  added, ROM, warm up , increased peripheral blood flow  1/4 inc resistance, 1/8   mat         Supine      Cane press ups + SA punch supine 20x 3#  with weighted cane  1/4 increased wt + SA punch 8   Triceps- B supine 20x 2#   wt 8   Horizonal abd/add  15x 1#  A    Protraction  20x 3#  B, added 23  1/4 inc wt.    Supine, flex at 90 10x all directions    external perturbations for joint stability-    Supine flexion 15 1# New    Sidelying

## 2024-01-25 ENCOUNTER — HOSPITAL ENCOUNTER (OUTPATIENT)
Dept: PHYSICAL THERAPY | Facility: CLINIC | Age: 24
Setting detail: THERAPIES SERIES
Discharge: HOME | End: 2024-01-25
Payer: MEDICAID

## 2024-01-25 PROCEDURE — 97110 THERAPEUTIC EXERCISES: CPT

## 2024-01-25 NOTE — FLOWSHEET NOTE
Neutral with Dumbbells  - 1 x daily - 7 x weekly - 2 sets - 10 reps - 3# weights hold  - Standing Bicep Curls Supinated with Dumbbells  - 1 x daily - 7 x weekly - 2 sets - 10 reps - 3# weights  hold  - Standing Row with Anchored Resistance  - 1 x daily - 7 x weekly - 2 sets - 10 reps  - Shoulder Extension with Resistance  - 1 x daily - 7 x weekly - 2 sets - 10 reps  - Shoulder External Rotation with Anchored Resistance  - 1 x daily - 7 x weekly - 2 sets - 10 reps  - Standing Tricep Extensions with Resistance  - 1 x daily - 7 x weekly - 2 sets - 10 reps    Comprehension of Education:  [] Verbalizes understanding.  [] Demonstrates understanding.  [] Needs review.  [x] Demonstrates/verbalizes HEP/Ed previously given.     Plan: [x] Continue current frequency toward long and short term goals.    [x] Specific Instructions for subsequent treatments: continue to work on light strengthening in pain free range, scapular strengthening      Time In:  5:06 pm   Time Out:   5:55 pm    Electronically signed by:  SANIYA DONOHUE PT

## 2024-01-30 ENCOUNTER — HOSPITAL ENCOUNTER (OUTPATIENT)
Dept: PHYSICAL THERAPY | Facility: CLINIC | Age: 24
Setting detail: THERAPIES SERIES
Discharge: HOME | End: 2024-01-30
Payer: MEDICAID

## 2024-01-30 PROCEDURE — 97110 THERAPEUTIC EXERCISES: CPT

## 2024-01-30 NOTE — FLOWSHEET NOTE
[] [facility]  Outpatient Rehabilitation &  Therapy  [address]  [phone]  F: [phone] [x] [facility]  Outpatient Rehabilitation &  Therapy  [address]   [address]  P: [phone]  F: [phone] [] [facility]  Outpatient Rehabilitation &  Therapy  [address]  P: [phone]  F: [phone]      Physical Therapy Daily Treatment Note    Date:  2024  Patient Name:  Anselmo Vu    :  2000  MRN: [id number]  Physician: Ashutosh Valencia MD                                 Insurance: [facility] (AUTH 12 visits, 48 units thru 2024)  Medical Diagnosis: S43.005A (ICD-10-CM) - Dislocation of left shoulder joint, initial encounter                     Rehab Codes: M25.512, M25.612, M79.622  Onset Date: 2023                      Next 's appt: as needed  Visit# / total visits: 10/12;    Cancels/No Shows: 0 /1    Subjective:    Pain:  [] Yes  [x] No Location: left shoulder  Pain Rating: (0-10 scale) 2/10   Pain altered Tx:  [x] No  [] Yes  Action:  Comments: Pt arrives noting overall she is getting better and feeling she can move her arm more. States yesterday at work they were short staffed and she had to do more than normal for her weekend shift and does have minor soreness and pain today.     Objective:  Modalities: ice/heat prn  Precautions: gentle strengthening x 6 weeks starting PT 2023  Essential tremor  Exercises:bolded completed  Exercise Reps/ Time Weight/ Level Comments   UBE 2'/2' Lv 2  added, ROM, warm up , increased peripheral blood flow  4 inc resistance,             Supine      Cane press ups + SA punch supine 20x 3#  with weighted cane   increased wt + SA punch    Triceps- B supine 20x 2#  wt    Horizonal abd/add  15x 2# A , inc    Protraction  15x2 3# B, added 23  1/4 inc wt. Inc reps    Rhythmic stab at 90 3x30\" 2# Changed    flexion 2x10 2# New  Consultant

## 2024-02-01 ENCOUNTER — HOSPITAL ENCOUNTER (OUTPATIENT)
Dept: PHYSICAL THERAPY | Facility: CLINIC | Age: 24
Setting detail: THERAPIES SERIES
Discharge: HOME | End: 2024-02-01
Payer: MEDICAID

## 2024-02-01 PROCEDURE — 97110 THERAPEUTIC EXERCISES: CPT

## 2024-02-01 NOTE — FLOWSHEET NOTE
[] [facility]  Outpatient Rehabilitation &  Therapy  [address]  [phone]  F: [phone] [x] [facility]  Outpatient Rehabilitation &  Therapy  [address]   [address]  P: [phone]  F: [phone] [] [facility]  Outpatient Rehabilitation &  Therapy  [address]  P: [phone]  F: [phone]      Physical Therapy Daily Treatment Note    Date:  2024  Patient Name:  Anselmo Vu    :  2000  MRN: [id number]  Physician: Ashutosh Valencia MD                                 Insurance: [facility] (AUTH 12 visits, 48 units thru 2024)  Medical Diagnosis: S43.005A (ICD-10-CM) - Dislocation of left shoulder joint, initial encounter                     Rehab Codes: M25.512, M25.612, M79.622  Onset Date: 2023                      Next 's appt: as needed  Visit# / total visits: ;    Cancels/No Shows: 0 /1    Subjective:    Pain:  [x] Yes  [] No Location: left shoulder  Pain Rating: (0-10 scale) 2/10   Pain altered Tx:  [x] No  [] Yes  Action:  Comments: Arrives with increased soreness in shoulder d/t driving to [place] yesterday.     Objective:  Modalities: ice/heat prn  Precautions: gentle strengthening x 6 weeks starting PT 2023  Essential tremor  Exercises:bolded completed  Exercise Reps/ Time Weight/ Level Comments   UBE 2'/2' Lv 2  added, ROM, warm up , increased peripheral blood flow  1/4 inc resistance,             Supine      Cane press ups + SA punch supine 20x 3#  with weighted cane  1/ increased wt + SA punch    Triceps- B supine 20x 2#  wt    Horizonal abd/add  20x 2# A , inc    Protraction  15x2 3# B, added 23  1/ inc wt. Inc reps    Rhythmic stab at 90 3x30\" 3# Changed    flexion 2x10 3# New , inc          Sidelying      ER 10x2 3# added 23, inc reps  ; incr wt    Abd to 90  20x 3# added 23, progressed ; incr wt

## 2024-02-07 ENCOUNTER — HOSPITAL ENCOUNTER (OUTPATIENT)
Dept: PHYSICAL THERAPY | Facility: CLINIC | Age: 24
Setting detail: THERAPIES SERIES
Discharge: HOME | End: 2024-02-07
Payer: MEDICAID

## 2024-02-07 PROCEDURE — 97110 THERAPEUTIC EXERCISES: CPT

## 2024-02-07 NOTE — FLOWSHEET NOTE
[] [facility]  Outpatient Rehabilitation &  Therapy  [address]  [phone]  F: [phone] [x] [facility]  Outpatient Rehabilitation &  Therapy  [address]   [address]  P: [phone]  F: [phone] [] [facility]  Outpatient Rehabilitation &  Therapy  [address]  P: [phone]  F: [phone]      Physical Therapy Daily Treatment Note/DISCHARGE    Date:  2024  Patient Name:  Anselmo Vu     :  2000  MRN: [id number]  Physician: Ashutosh Valencia MD                                 Insurance: [facility] (AUTH 12 visits, 48 units thru 2024)  Medical Diagnosis: S43.005A (ICD-10-CM) - Dislocation of left shoulder joint, initial encounter                     Rehab Codes: M25.512, M25.612, M79.622  Onset Date: 2023                      Next 's appt: as needed  Visit# / total visits: ;    Cancels/No Shows: 0 /1    Subjective:    Pain:  [x] Yes  [] No Location: left shoulder  Pain Rating: (0-10 scale) 2/10   Pain altered Tx:  [x] No  [] Yes  Action:  Comments: Arrives with reports of \"stiffness\"  Continued difficulty with sleeping, tight and pain ful at times.     Objective:  Modalities: ice/heat prn  Precautions: gentle strengthening x 6 weeks starting PT 2023  Essential tremor  Exercises:bolded completed  Exercise Reps/ Time Weight/ Level Comments   UBE 2'/2' Lv 2  added, ROM, warm up , increased peripheral blood flow  1/4 inc resistance,             Supine      Cane press ups + SA punch supine 20x 3#  with weighted cane  1/4 increased wt + SA punch    Triceps- B supine 20x 2#  wt    Horizonal abd/add  20x 2# A , inc    Protraction  15x2 3# B, added 23  1/4 inc wt. Inc reps    Rhythmic stab at 90 3x30\" 3# Changed    flexion 2x10 3# New , inc          Sidelying      ER 10x2 3# added 23, inc reps  ; incr wt    Abd to 90  20x 3# added